# Patient Record
Sex: MALE
[De-identification: names, ages, dates, MRNs, and addresses within clinical notes are randomized per-mention and may not be internally consistent; named-entity substitution may affect disease eponyms.]

---

## 2020-07-10 ENCOUNTER — DASHBOARD ENCOUNTERS (OUTPATIENT)
Age: 20
End: 2020-07-10

## 2020-07-10 ENCOUNTER — OFFICE VISIT (OUTPATIENT)
Dept: URBAN - METROPOLITAN AREA CLINIC 82 | Facility: CLINIC | Age: 20
End: 2020-07-10

## 2020-07-10 DIAGNOSIS — K58.9 IBS (IRRITABLE BOWEL SYNDROME)-C: ICD-10-CM

## 2020-07-10 DIAGNOSIS — K64.4 HEMORRHOIDS, EXTERNAL: ICD-10-CM

## 2020-07-10 DIAGNOSIS — R10.13 DYSPEPSIA: ICD-10-CM

## 2020-07-10 PROBLEM — 162031009 DYSPEPSIA: Status: ACTIVE | Noted: 2020-07-10

## 2020-07-10 PROBLEM — 31704005 RESIDUAL HEMORRHOIDAL SKIN TAGS: Status: ACTIVE | Noted: 2020-07-10

## 2020-07-10 PROCEDURE — G9903 PT SCRN TBCO ID AS NON USER: HCPCS | Performed by: INTERNAL MEDICINE

## 2020-07-10 PROCEDURE — 99203 OFFICE O/P NEW LOW 30 MIN: CPT | Performed by: INTERNAL MEDICINE

## 2020-07-10 PROCEDURE — G8420 CALC BMI NORM PARAMETERS: HCPCS | Performed by: INTERNAL MEDICINE

## 2020-07-10 PROCEDURE — G8427 DOCREV CUR MEDS BY ELIG CLIN: HCPCS | Performed by: INTERNAL MEDICINE

## 2020-07-10 RX ORDER — PANTOPRAZOLE SODIUM 40 MG/1
1 TABLET TABLET, DELAYED RELEASE ORAL ONCE A DAY
Qty: 30 TABLET | Refills: 1 | OUTPATIENT
Start: 2020-07-10

## 2020-07-10 RX ORDER — POLYETHYLENE GLYCOL 3350 17 G/17G
AS DIRECTED POWDER, FOR SOLUTION ORAL ONCE A DAY
Qty: 30 PACK | Refills: 1 | OUTPATIENT
Start: 2020-07-10 | End: 2020-09-08

## 2020-07-10 NOTE — HPI-TODAY'S VISIT:
This is a 20-year-old -American male came to the office with complaints of having nausea vomiting intermittent abdominal discomfort that comes at random as well as some urgency and rectal discomfort.  Patient stated off and on he had symptoms of having some stomach upset as well as indigestion and occasional heartburn however he also gets a complaints of having abdominal cramping bloating which are relieved after having a bowel movement.  He also reports having some stool escaping and mucousy stool especially when he is trying to urinate which is also at random.  Patient admits more constipation he denies constant diarrhea.  Patient also stated he gets his random episodes of epigastric abdominal pain nausea vomiting.  Most recent episode was when he ate a [employer] he could not tell if he ate too fast .  He gets to full and feels better after having emesis. Patient reports taking IBgard intermittently which helps some of the abdominal cramping.  He is sexually active occasionally and he prefers same-sex and he denies having any sexually transmitted diseases in the recent past.  However he did admit that he was concerned about more urgency after he had run 1 intimated encounter few months ago.  He denies rectal bleeding most recent yearly exam was about 6 months ago was told to be unremarkable.

## 2020-07-10 NOTE — PHYSICAL EXAM GASTROINTESTINAL
Abdomen , soft, nontender, nondistended , no guarding or rigidity , no masses palpable , normal bowel sounds , Liver and Spleen , no hepatomegaly present , no hepatosplenomegaly , liver nontender , spleen not palpable , Rectal , normal sphincter tone , external and internal hemorrhoids, rectal masses or bleeding present  hard brown stools noted  no fissure no ulcers noted

## 2020-07-15 ENCOUNTER — CLAIMS CREATED FROM THE CLAIM WINDOW (OUTPATIENT)
Dept: URBAN - METROPOLITAN AREA CLINIC 114 | Facility: CLINIC | Age: 20
End: 2020-07-15

## 2020-07-15 ENCOUNTER — TELEPHONE ENCOUNTER (OUTPATIENT)
Dept: URBAN - METROPOLITAN AREA CLINIC 92 | Facility: CLINIC | Age: 20
End: 2020-07-15

## 2020-07-15 ENCOUNTER — OFFICE VISIT (OUTPATIENT)
Dept: URBAN - METROPOLITAN AREA CLINIC 114 | Facility: CLINIC | Age: 20
End: 2020-07-15

## 2020-07-15 DIAGNOSIS — A04.8 BACTERIAL INFECTION DUE TO H. PYLORI: ICD-10-CM

## 2020-07-15 PROCEDURE — 83013 H PYLORI (C-13) BREATH: CPT | Performed by: INTERNAL MEDICINE
